# Patient Record
Sex: MALE | Race: WHITE | NOT HISPANIC OR LATINO | Employment: FULL TIME | RURAL
[De-identification: names, ages, dates, MRNs, and addresses within clinical notes are randomized per-mention and may not be internally consistent; named-entity substitution may affect disease eponyms.]

---

## 2021-12-31 ENCOUNTER — HOSPITAL ENCOUNTER (EMERGENCY)
Facility: HOSPITAL | Age: 31
Discharge: HOME | End: 2021-12-31
Payer: COMMERCIAL

## 2021-12-31 VITALS
BODY MASS INDEX: 20.38 KG/M2 | RESPIRATION RATE: 18 BRPM | OXYGEN SATURATION: 98 % | HEIGHT: 63 IN | HEART RATE: 103 BPM | DIASTOLIC BLOOD PRESSURE: 87 MMHG | WEIGHT: 115 LBS | SYSTOLIC BLOOD PRESSURE: 128 MMHG | TEMPERATURE: 102 F

## 2021-12-31 DIAGNOSIS — K08.89 TOOTHACHE: Primary | ICD-10-CM

## 2021-12-31 LAB
FLUAV AG UPPER RESP QL IA.RAPID: NEGATIVE
FLUBV AG UPPER RESP QL IA.RAPID: NEGATIVE
SARS-COV+SARS-COV-2 AG RESP QL IA.RAPID: NEGATIVE

## 2021-12-31 PROCEDURE — 87428 SARSCOV & INF VIR A&B AG IA: CPT | Performed by: NURSE PRACTITIONER

## 2021-12-31 PROCEDURE — 99283 PR EMERGENCY DEPT VISIT,LEVEL III: ICD-10-PCS | Mod: ,,, | Performed by: NURSE PRACTITIONER

## 2021-12-31 PROCEDURE — 99283 EMERGENCY DEPT VISIT LOW MDM: CPT | Mod: ,,, | Performed by: NURSE PRACTITIONER

## 2021-12-31 PROCEDURE — 99283 EMERGENCY DEPT VISIT LOW MDM: CPT

## 2021-12-31 PROCEDURE — 25000003 PHARM REV CODE 250: Performed by: NURSE PRACTITIONER

## 2021-12-31 RX ORDER — ACETAMINOPHEN 500 MG
1000 TABLET ORAL
Status: COMPLETED | OUTPATIENT
Start: 2021-12-31 | End: 2021-12-31

## 2021-12-31 RX ORDER — AMOXICILLIN 500 MG/1
500 CAPSULE ORAL 3 TIMES DAILY
Qty: 30 CAPSULE | Refills: 0 | Status: SHIPPED | OUTPATIENT
Start: 2021-12-31 | End: 2022-01-10

## 2021-12-31 RX ADMIN — ACETAMINOPHEN 1000 MG: 500 TABLET ORAL at 03:12

## 2021-12-31 NOTE — ED PROVIDER NOTES
Encounter Date: 12/31/2021       History     Chief Complaint   Patient presents with    Fever     31 year old male presents to the emergency department to be evaluated for fever and fatigue that began today. Denies any cough, runny nose, body aches, chills, nausea, vomiting, diarrhea, abdominal pain, loss of sense of taste or smell, known sick contacts. His uncle who is with him said he had similar symptoms earlier this week, tested negative for COVID, and his symptoms have resolved.    The history is provided by the patient.   Fever  Primary symptoms of the febrile illness include fever and fatigue. Primary symptoms do not include visual change, headaches, cough, wheezing, shortness of breath, abdominal pain, nausea, vomiting, diarrhea, dysuria, altered mental status, myalgias, arthralgias or rash. The current episode started today.     Review of patient's allergies indicates:   Allergen Reactions    Morphine      History reviewed. No pertinent past medical history.  History reviewed. No pertinent surgical history.  History reviewed. No pertinent family history.  Social History     Tobacco Use    Smoking status: Current Every Day Smoker    Smokeless tobacco: Never Used   Substance Use Topics    Alcohol use: Not Currently    Drug use: Never     Review of Systems   Constitutional: Positive for fatigue and fever.   Respiratory: Negative for cough, shortness of breath and wheezing.    Gastrointestinal: Negative for abdominal pain, diarrhea, nausea and vomiting.   Genitourinary: Negative for dysuria.   Musculoskeletal: Negative for arthralgias and myalgias.   Skin: Negative for rash.   Neurological: Negative for headaches.   All other systems reviewed and are negative.      Physical Exam     Initial Vitals   BP Pulse Resp Temp SpO2   12/31/21 1528 12/31/21 1528 12/31/21 1528 12/31/21 1524 12/31/21 1528   128/87 103 18 (!) 102.3 °F (39.1 °C) 98 %      MAP       --                Physical Exam    Vitals  reviewed.  Constitutional: He appears well-developed and well-nourished.   HENT:   Mouth/Throat: Oropharynx is clear and moist and mucous membranes are normal. No dental abscesses.       Neck: Neck supple.   Cardiovascular: Normal rate and regular rhythm.   Pulmonary/Chest: Breath sounds normal.   Abdominal: Abdomen is soft. Bowel sounds are normal. He exhibits no distension and no mass. There is no abdominal tenderness. There is no rebound and no guarding.   Musculoskeletal:         General: Normal range of motion.      Cervical back: Neck supple.     Neurological: He is alert and oriented to person, place, and time. He has normal strength. GCS score is 15. GCS eye subscore is 4. GCS verbal subscore is 5. GCS motor subscore is 6.   Skin: Skin is warm and dry. Capillary refill takes less than 2 seconds.   Psychiatric: He has a normal mood and affect.         Medical Screening Exam   See Full Note    ED Course   Procedures  Labs Reviewed   SARS-COV2 (COVID) W/ FLU ANTIGEN - Normal    Narrative:     Negative SARS-CoV results should not be used as the sole basis for treatment or patient management decisions; negative results should be considered in the context of a patient's recent exposures, history and the presene of clinical signs and symptoms consistent with COVID-19.  Negative results should be treated as presumptive and confirmed by molecular assay, if necessary for patient management.          Imaging Results    None          Medications   acetaminophen tablet 1,000 mg (1,000 mg Oral Given 12/31/21 1524)                       Clinical Impression:   Final diagnoses:  [K08.89] Toothache (Primary)          ED Disposition Condition    Discharge Stable        ED Prescriptions     Medication Sig Dispense Start Date End Date Auth. Provider    amoxicillin (AMOXIL) 500 MG capsule Take 1 capsule (500 mg total) by mouth 3 (three) times daily. for 10 days 30 capsule 12/31/2021 1/10/2022 NEHEMIAH Nunez         Follow-up Information    None          Elsie Redd, NEHEMIAH  12/31/21 1600       Elsie Redd, NEHEMIAH  12/31/21 1637

## 2021-12-31 NOTE — Clinical Note
"Trav"Davis Santos was seen and treated in our emergency department on 12/31/2021.  He may return to work on 01/03/2022.       If you have any questions or concerns, please don't hesitate to call.      NEHEMIAH Nunez"

## 2021-12-31 NOTE — DISCHARGE INSTRUCTIONS
Follow up with your primary care provider in 2 days. Return to the emergency department for any increase in symptoms or for any other new or worrisome symptoms.

## 2022-03-05 ENCOUNTER — HOSPITAL ENCOUNTER (EMERGENCY)
Facility: HOSPITAL | Age: 32
Discharge: HOME | End: 2022-03-05
Attending: EMERGENCY MEDICINE
Payer: COMMERCIAL

## 2022-03-05 VITALS
DIASTOLIC BLOOD PRESSURE: 82 MMHG | WEIGHT: 109 LBS | HEIGHT: 64 IN | BODY MASS INDEX: 18.61 KG/M2 | OXYGEN SATURATION: 99 % | SYSTOLIC BLOOD PRESSURE: 128 MMHG | RESPIRATION RATE: 16 BRPM | HEART RATE: 94 BPM | TEMPERATURE: 98 F

## 2022-03-05 DIAGNOSIS — A08.4 VIRAL GASTROENTERITIS: Primary | ICD-10-CM

## 2022-03-05 LAB
ALBUMIN SERPL BCP-MCNC: 4 G/DL (ref 3.5–5)
ALBUMIN/GLOB SERPL: 1.4 {RATIO}
ALP SERPL-CCNC: 89 U/L (ref 45–115)
ALT SERPL W P-5'-P-CCNC: 31 U/L (ref 16–61)
ANION GAP SERPL CALCULATED.3IONS-SCNC: 13 MMOL/L (ref 7–16)
AST SERPL W P-5'-P-CCNC: 28 U/L (ref 15–37)
BASOPHILS # BLD AUTO: 0.01 K/UL (ref 0–0.2)
BASOPHILS NFR BLD AUTO: 0.2 % (ref 0–1)
BILIRUB SERPL-MCNC: 0.2 MG/DL (ref 0–1.2)
BUN SERPL-MCNC: 9 MG/DL (ref 7–18)
BUN/CREAT SERPL: 11 (ref 6–20)
CALCIUM SERPL-MCNC: 8.3 MG/DL (ref 8.5–10.1)
CHLORIDE SERPL-SCNC: 101 MMOL/L (ref 98–107)
CO2 SERPL-SCNC: 27 MMOL/L (ref 21–32)
CREAT SERPL-MCNC: 0.82 MG/DL (ref 0.7–1.3)
DIFFERENTIAL METHOD BLD: ABNORMAL
EOSINOPHIL # BLD AUTO: 0.03 K/UL (ref 0–0.5)
EOSINOPHIL NFR BLD AUTO: 0.7 % (ref 1–4)
ERYTHROCYTE [DISTWIDTH] IN BLOOD BY AUTOMATED COUNT: 12.7 % (ref 11.5–14.5)
GLOBULIN SER-MCNC: 2.9 G/DL (ref 2–4)
GLUCOSE SERPL-MCNC: 87 MG/DL (ref 74–106)
HCT VFR BLD AUTO: 42.8 % (ref 40–54)
HGB BLD-MCNC: 14.9 G/DL (ref 13.5–18)
IMM GRANULOCYTES # BLD AUTO: 0.01 K/UL (ref 0–0.04)
IMM GRANULOCYTES NFR BLD: 0.2 % (ref 0–0.4)
LYMPHOCYTES # BLD AUTO: 0.99 K/UL (ref 1–4.8)
LYMPHOCYTES NFR BLD AUTO: 23 % (ref 27–41)
MCH RBC QN AUTO: 32 PG (ref 27–31)
MCHC RBC AUTO-ENTMCNC: 34.8 G/DL (ref 32–36)
MCV RBC AUTO: 92 FL (ref 80–96)
MONOCYTES # BLD AUTO: 0.51 K/UL (ref 0–0.8)
MONOCYTES NFR BLD AUTO: 11.9 % (ref 2–6)
MPC BLD CALC-MCNC: 10.1 FL (ref 9.4–12.4)
NEUTROPHILS # BLD AUTO: 2.75 K/UL (ref 1.8–7.7)
NEUTROPHILS NFR BLD AUTO: 64 % (ref 53–65)
NRBC # BLD AUTO: 0 X10E3/UL
NRBC, AUTO (.00): 0 %
PLATELET # BLD AUTO: 123 K/UL (ref 150–400)
POTASSIUM SERPL-SCNC: 3.8 MMOL/L (ref 3.5–5.1)
PROT SERPL-MCNC: 6.9 G/DL (ref 6.4–8.2)
RBC # BLD AUTO: 4.65 M/UL (ref 4.6–6.2)
SODIUM SERPL-SCNC: 137 MMOL/L (ref 136–145)
WBC # BLD AUTO: 4.3 K/UL (ref 4.5–11)

## 2022-03-05 PROCEDURE — 96374 THER/PROPH/DIAG INJ IV PUSH: CPT

## 2022-03-05 PROCEDURE — 96361 HYDRATE IV INFUSION ADD-ON: CPT

## 2022-03-05 PROCEDURE — 36415 COLL VENOUS BLD VENIPUNCTURE: CPT | Performed by: EMERGENCY MEDICINE

## 2022-03-05 PROCEDURE — 85025 COMPLETE CBC W/AUTO DIFF WBC: CPT | Performed by: EMERGENCY MEDICINE

## 2022-03-05 PROCEDURE — 63600175 PHARM REV CODE 636 W HCPCS: Performed by: EMERGENCY MEDICINE

## 2022-03-05 PROCEDURE — 25000003 PHARM REV CODE 250: Performed by: EMERGENCY MEDICINE

## 2022-03-05 PROCEDURE — 99283 PR EMERGENCY DEPT VISIT,LEVEL III: ICD-10-PCS | Mod: ,,, | Performed by: EMERGENCY MEDICINE

## 2022-03-05 PROCEDURE — 99284 EMERGENCY DEPT VISIT MOD MDM: CPT | Mod: 25

## 2022-03-05 PROCEDURE — 80053 COMPREHEN METABOLIC PANEL: CPT | Performed by: EMERGENCY MEDICINE

## 2022-03-05 PROCEDURE — 99283 EMERGENCY DEPT VISIT LOW MDM: CPT | Mod: ,,, | Performed by: EMERGENCY MEDICINE

## 2022-03-05 RX ORDER — ONDANSETRON 2 MG/ML
4 INJECTION INTRAMUSCULAR; INTRAVENOUS
Status: COMPLETED | OUTPATIENT
Start: 2022-03-05 | End: 2022-03-05

## 2022-03-05 RX ADMIN — SODIUM CHLORIDE 1000 ML: 9 INJECTION, SOLUTION INTRAVENOUS at 10:03

## 2022-03-05 RX ADMIN — ONDANSETRON 4 MG: 2 INJECTION INTRAMUSCULAR; INTRAVENOUS at 10:03

## 2022-03-05 NOTE — DISCHARGE INSTRUCTIONS
TAKE NAUSEA MEDICATION AS DIRECTED.  DRINK PLENTY OF FLUIDS.  RETURN TO THE EMERGENCY DEPARTMENT OR FOLLOW UP WITH YOUR PRIMARY CARE PROVIDER IF SYMPTOMS PERSIST OR WORSEN OR OTHERWISE AS NEEDED.

## 2022-03-05 NOTE — ED PROVIDER NOTES
Encounter Date: 3/5/2022       History     Chief Complaint   Patient presents with    Abdominal Pain    Diarrhea     30 y/o male with nausea, vomiting, and diarrhea that started 4 days ago.  He has been having abdominal cramping.  Symptoms are moderate.  Patient notes no remitting or exacerbating factors.          Review of patient's allergies indicates:   Allergen Reactions    Morphine      Past Medical History:   Diagnosis Date    SVT (supraventricular tachycardia)      History reviewed. No pertinent surgical history.  No family history on file.  Social History     Tobacco Use    Smoking status: Current Every Day Smoker     Types: Cigarettes    Smokeless tobacco: Never Used   Substance Use Topics    Alcohol use: Not Currently    Drug use: Never     Review of Systems   All other systems reviewed and are negative.      Physical Exam     Initial Vitals [03/05/22 0958]   BP Pulse Resp Temp SpO2   128/82 94 16 98.1 °F (36.7 °C) 99 %      MAP       --         Physical Exam    Nursing note and vitals reviewed.  Constitutional: He appears well-developed and well-nourished.   HENT:   Head: Normocephalic and atraumatic.   Nose: Nose normal.   Mouth/Throat: Oropharynx is clear and moist.   Eyes: Conjunctivae and EOM are normal. Pupils are equal, round, and reactive to light.   Neck: Neck supple.   Normal range of motion.  Cardiovascular: Normal rate, regular rhythm, normal heart sounds and intact distal pulses.   Pulmonary/Chest: Breath sounds normal.   Abdominal: Abdomen is soft. Bowel sounds are normal.   Musculoskeletal:         General: Normal range of motion.      Cervical back: Normal range of motion and neck supple.     Neurological: He is alert and oriented to person, place, and time. He has normal strength. GCS score is 15. GCS eye subscore is 4. GCS verbal subscore is 5. GCS motor subscore is 6.   Skin: Skin is warm and dry. Capillary refill takes less than 2 seconds.   Psychiatric: He has a normal mood and  affect. Thought content normal.         Medical Screening Exam   See Full Note    ED Course   Procedures  Labs Reviewed   COMPREHENSIVE METABOLIC PANEL - Abnormal; Notable for the following components:       Result Value    Calcium 8.3 (*)     All other components within normal limits   CBC WITH DIFFERENTIAL - Abnormal; Notable for the following components:    WBC 4.30 (*)     MCH 32.0 (*)     Platelet Count 123 (*)     Lymphocytes % 23.0 (*)     Monocytes % 11.9 (*)     Eosinophils % 0.7 (*)     Lymphocytes, Absolute 0.99 (*)     All other components within normal limits   CBC W/ AUTO DIFFERENTIAL    Narrative:     The following orders were created for panel order CBC auto differential.  Procedure                               Abnormality         Status                     ---------                               -----------         ------                     CBC with Differential[[id number]]        Abnormal            Final result                 Please view results for these tests on the individual orders.          Imaging Results    None          Medications   sodium chloride 0.9% bolus 1,000 mL (1,000 mLs Intravenous New Bag 3/5/22 1042)   ondansetron injection 4 mg (4 mg Intravenous Given 3/5/22 1043)                       Clinical Impression:   Final diagnoses:  [A08.4] Viral gastroenteritis (Primary)          ED Disposition Condition    Discharge Stable        ED Prescriptions     None        Follow-up Information     Follow up With Specialties Details Why Contact Info    [facility] - Emergency Department Emergency Medicine  As needed [address] [place] [id number] [phone]           German Regalado MD  03/05/22 1570

## 2022-05-31 ENCOUNTER — HOSPITAL ENCOUNTER (EMERGENCY)
Facility: HOSPITAL | Age: 32
Discharge: HOME | End: 2022-05-31
Attending: FAMILY MEDICINE
Payer: COMMERCIAL

## 2022-05-31 VITALS
HEIGHT: 64 IN | OXYGEN SATURATION: 98 % | SYSTOLIC BLOOD PRESSURE: 103 MMHG | RESPIRATION RATE: 22 BRPM | DIASTOLIC BLOOD PRESSURE: 69 MMHG | HEART RATE: 82 BPM | BODY MASS INDEX: 17.93 KG/M2 | TEMPERATURE: 99 F | WEIGHT: 105 LBS

## 2022-05-31 DIAGNOSIS — R07.9 CHEST PAIN: ICD-10-CM

## 2022-05-31 DIAGNOSIS — U07.1 COVID: ICD-10-CM

## 2022-05-31 DIAGNOSIS — J06.9 UPPER RESPIRATORY TRACT INFECTION, UNSPECIFIED TYPE: Primary | ICD-10-CM

## 2022-05-31 LAB
ALBUMIN SERPL BCP-MCNC: 4 G/DL (ref 3.5–5)
ALBUMIN/GLOB SERPL: 1.3 {RATIO}
ALP SERPL-CCNC: 90 U/L (ref 45–115)
ALT SERPL W P-5'-P-CCNC: 28 U/L (ref 16–61)
ANION GAP SERPL CALCULATED.3IONS-SCNC: 13 MMOL/L (ref 7–16)
AST SERPL W P-5'-P-CCNC: 43 U/L (ref 15–37)
BASOPHILS # BLD AUTO: 0.01 K/UL (ref 0–0.2)
BASOPHILS NFR BLD AUTO: 0.2 % (ref 0–1)
BILIRUB SERPL-MCNC: 0.2 MG/DL (ref 0–1.2)
BUN SERPL-MCNC: 12 MG/DL (ref 7–18)
BUN/CREAT SERPL: 13 (ref 6–20)
CALCIUM SERPL-MCNC: 8.8 MG/DL (ref 8.5–10.1)
CHLORIDE SERPL-SCNC: 100 MMOL/L (ref 98–107)
CO2 SERPL-SCNC: 26 MMOL/L (ref 21–32)
CREAT SERPL-MCNC: 0.96 MG/DL (ref 0.7–1.3)
DIFFERENTIAL METHOD BLD: ABNORMAL
EOSINOPHIL # BLD AUTO: 0.03 K/UL (ref 0–0.5)
EOSINOPHIL NFR BLD AUTO: 0.6 % (ref 1–4)
ERYTHROCYTE [DISTWIDTH] IN BLOOD BY AUTOMATED COUNT: 12.7 % (ref 11.5–14.5)
FLUAV AG UPPER RESP QL IA.RAPID: NEGATIVE
FLUBV AG UPPER RESP QL IA.RAPID: NEGATIVE
GLOBULIN SER-MCNC: 3.1 G/DL (ref 2–4)
GLUCOSE SERPL-MCNC: 88 MG/DL (ref 74–106)
HCT VFR BLD AUTO: 43.1 % (ref 40–54)
HGB BLD-MCNC: 15 G/DL (ref 13.5–18)
IMM GRANULOCYTES # BLD AUTO: 0.01 K/UL (ref 0–0.04)
IMM GRANULOCYTES NFR BLD: 0.2 % (ref 0–0.4)
LYMPHOCYTES # BLD AUTO: 1.75 K/UL (ref 1–4.8)
LYMPHOCYTES NFR BLD AUTO: 32.6 % (ref 27–41)
MCH RBC QN AUTO: 32.2 PG (ref 27–31)
MCHC RBC AUTO-ENTMCNC: 34.8 G/DL (ref 32–36)
MCV RBC AUTO: 92.5 FL (ref 80–96)
MONOCYTES # BLD AUTO: 0.54 K/UL (ref 0–0.8)
MONOCYTES NFR BLD AUTO: 10.1 % (ref 2–6)
MPC BLD CALC-MCNC: 10.6 FL (ref 9.4–12.4)
NEUTROPHILS # BLD AUTO: 3.03 K/UL (ref 1.8–7.7)
NEUTROPHILS NFR BLD AUTO: 56.3 % (ref 53–65)
NRBC # BLD AUTO: 0 X10E3/UL
NRBC, AUTO (.00): 0 %
NT-PROBNP SERPL-MCNC: 14 PG/ML (ref 1–125)
PLATELET # BLD AUTO: 108 K/UL (ref 150–400)
POTASSIUM SERPL-SCNC: 4.2 MMOL/L (ref 3.5–5.1)
PROT SERPL-MCNC: 7.1 G/DL (ref 6.4–8.2)
RBC # BLD AUTO: 4.66 M/UL (ref 4.6–6.2)
SARS-COV+SARS-COV-2 AG RESP QL IA.RAPID: POSITIVE
SODIUM SERPL-SCNC: 135 MMOL/L (ref 136–145)
TROPONIN I SERPL HS-MCNC: 7.3 PG/ML
WBC # BLD AUTO: 5.37 K/UL (ref 4.5–11)

## 2022-05-31 PROCEDURE — 84484 ASSAY OF TROPONIN QUANT: CPT | Performed by: FAMILY MEDICINE

## 2022-05-31 PROCEDURE — 63600175 PHARM REV CODE 636 W HCPCS: Performed by: FAMILY MEDICINE

## 2022-05-31 PROCEDURE — 96375 TX/PRO/DX INJ NEW DRUG ADDON: CPT

## 2022-05-31 PROCEDURE — 83880 ASSAY OF NATRIURETIC PEPTIDE: CPT | Performed by: FAMILY MEDICINE

## 2022-05-31 PROCEDURE — 93010 ELECTROCARDIOGRAM REPORT: CPT | Mod: ,,, | Performed by: INTERNAL MEDICINE

## 2022-05-31 PROCEDURE — 99284 PR EMERGENCY DEPT VISIT,LEVEL IV: ICD-10-PCS | Mod: ,,, | Performed by: FAMILY MEDICINE

## 2022-05-31 PROCEDURE — 85025 COMPLETE CBC W/AUTO DIFF WBC: CPT | Performed by: FAMILY MEDICINE

## 2022-05-31 PROCEDURE — 96374 THER/PROPH/DIAG INJ IV PUSH: CPT

## 2022-05-31 PROCEDURE — 99285 EMERGENCY DEPT VISIT HI MDM: CPT | Mod: 25

## 2022-05-31 PROCEDURE — 93005 ELECTROCARDIOGRAM TRACING: CPT

## 2022-05-31 PROCEDURE — 80053 COMPREHEN METABOLIC PANEL: CPT | Performed by: FAMILY MEDICINE

## 2022-05-31 PROCEDURE — 36415 COLL VENOUS BLD VENIPUNCTURE: CPT | Performed by: FAMILY MEDICINE

## 2022-05-31 PROCEDURE — 87428 SARSCOV & INF VIR A&B AG IA: CPT | Performed by: FAMILY MEDICINE

## 2022-05-31 PROCEDURE — 99284 EMERGENCY DEPT VISIT MOD MDM: CPT | Mod: ,,, | Performed by: FAMILY MEDICINE

## 2022-05-31 PROCEDURE — 93010 EKG 12-LEAD: ICD-10-PCS | Mod: ,,, | Performed by: INTERNAL MEDICINE

## 2022-05-31 RX ORDER — KETOROLAC TROMETHAMINE 30 MG/ML
30 INJECTION, SOLUTION INTRAMUSCULAR; INTRAVENOUS
Status: COMPLETED | OUTPATIENT
Start: 2022-05-31 | End: 2022-05-31

## 2022-05-31 RX ORDER — DEXAMETHASONE SODIUM PHOSPHATE 4 MG/ML
4 INJECTION, SOLUTION INTRA-ARTICULAR; INTRALESIONAL; INTRAMUSCULAR; INTRAVENOUS; SOFT TISSUE
Status: COMPLETED | OUTPATIENT
Start: 2022-05-31 | End: 2022-05-31

## 2022-05-31 RX ADMIN — KETOROLAC TROMETHAMINE 30 MG: 30 INJECTION, SOLUTION INTRAMUSCULAR; INTRAVENOUS at 01:05

## 2022-05-31 RX ADMIN — DEXAMETHASONE SODIUM PHOSPHATE 4 MG: 4 INJECTION, SOLUTION INTRAMUSCULAR; INTRAVENOUS at 01:05

## 2022-05-31 NOTE — Clinical Note
"Trav"Davis Santos was seen and treated in our emergency department on 5/31/2022.  He may return to work on 06/06/2022.       If you have any questions or concerns, please don't hesitate to call.      Colette Crawford MD"

## 2022-05-31 NOTE — ED PROVIDER NOTES
Encounter Date: 5/31/2022       History     Chief Complaint   Patient presents with    Chest Pain    Dizziness     Patient is a 32-year-old  male, presents emergency department with ear pain, chest pain come a and jaw pain.  Patient states 3 days ago he had a subjective fever, ear pain, and a sore throat.  He feels like it was due to sinuses and reports that it has mostly subsided.  He is concerned he has an ear infection.  This morning at work he picked up something heavy and started experiencing chest pain.  He has got a history of SVT, but no other cardiac history.  He denies any palpitations.  Pain is worse when he moves.        Review of patient's allergies indicates:   Allergen Reactions    Morphine      Past Medical History:   Diagnosis Date    SVT (supraventricular tachycardia)      History reviewed. No pertinent surgical history.  History reviewed. No pertinent family history.  Social History     Tobacco Use    Smoking status: Current Every Day Smoker     Types: Cigarettes    Smokeless tobacco: Never Used   Substance Use Topics    Alcohol use: Not Currently    Drug use: Never     Review of Systems   Constitutional: Positive for fever.   HENT: Positive for ear pain and sore throat.    Cardiovascular: Positive for chest pain.   All other systems reviewed and are negative.      Physical Exam     Initial Vitals [05/31/22 1223]   BP Pulse Resp Temp SpO2   105/78 (!) 114 16 98.7 °F (37.1 °C) 98 %      MAP       --         Physical Exam    Constitutional: He appears well-developed and well-nourished.   HENT:   Head: Normocephalic.   Right Ear: External ear normal.   Left Ear: External ear normal.   TMs normal bilaterally, tonsils are not swollen, erythematous oropharynx with drainage posteriorly.   Eyes: Pupils are equal, round, and reactive to light.   Cardiovascular: Normal rate.   Pulmonary/Chest: Breath sounds normal. No respiratory distress. He has no wheezes.   Abdominal: Abdomen is soft.  Bowel sounds are normal. He exhibits no distension.     Neurological: He is alert and oriented to person, place, and time.   Psychiatric: He has a normal mood and affect.         Medical Screening Exam   See Full Note    ED Course   Procedures  Labs Reviewed   COMPREHENSIVE METABOLIC PANEL - Abnormal; Notable for the following components:       Result Value    Sodium 135 (*)     AST 43 (*)     All other components within normal limits   CBC WITH DIFFERENTIAL - Abnormal; Notable for the following components:    MCH 32.2 (*)     Platelet Count 108 (*)     Monocytes % 10.1 (*)     Eosinophils % 0.6 (*)     All other components within normal limits   SARS-COV2 (COVID) W/ FLU ANTIGEN - Abnormal; Notable for the following components:    COVID-19 Ag Positive (*)     All other components within normal limits    Narrative:     Positive SARS-CoV antigen results indicate the presence of viral antigens; correlation with patient history and presence of clinical signs & symptoms consistent with COVID-19 are necessary to determine infection status.   TROPONIN I - Normal   NT-PRO NATRIURETIC PEPTIDE - Normal   CBC W/ AUTO DIFFERENTIAL    Narrative:     The following orders were created for panel order CBC auto differential.  Procedure                               Abnormality         Status                     ---------                               -----------         ------                     CBC with Differential[[id number]]        Abnormal            Final result                 Please view results for these tests on the individual orders.   EXTRA TUBES    Narrative:     The following orders were created for panel order EXTRA TUBES.  Procedure                               Abnormality         Status                     ---------                               -----------         ------                     Light Blue Top Hold[[id number]]                              In process                   Please view results for these tests  on the individual orders.   LIGHT BLUE TOP HOLD        ECG Results          EKG 12-lead (Final result)  Result time 05/31/22 22:06:06    Final result by Interface, Lab In [facility] (05/31/22 22:06:06)                 Narrative:    Test Reason : R07.9,    Vent. Rate : 097 BPM     Atrial Rate : 000 BPM     P-R Int : 108 ms          QRS Dur : 080 ms      QT Int : 316 ms       P-R-T Axes : 077 095 055 degrees     QTc Int : 381 ms    Sinus rhythm  Short MN interval  Rightward axis  Borderline ECG    Confirmed by Ezekiel VELAZQUEZ, Candie BONDS ([id number]) on 5/31/2022 10:06:02 PM    Referred By: [email or web address]   SELF           Confirmed By:Candie Falcon MD                            Imaging Results          X-Ray Chest AP Portable (Final result)  Result time 05/31/22 12:58:33    Final result by Jagdish Virgen MD (05/31/22 12:58:33)                 Impression:      No acute cardiopulmonary process      Electronically signed by: Jagdish Virgen  Date:    05/31/2022  Time:    12:58             Narrative:    EXAMINATION:  XR CHEST AP PORTABLE    CLINICAL HISTORY:  Chest Pain;.    COMPARISON:  None    TECHNIQUE:  AP portable erect chest    FINDINGS:  The cardiac silhouette is not enlarged.  There is no mediastinal mass.  There is no pulmonary vascular engorgement.    Lungs and pleural spaces are generally clear.    Osseous structures are unremarkable                                 Medications   dexamethasone injection 4 mg (4 mg Intravenous Given 5/31/22 1359)   ketorolac injection 30 mg (30 mg Intravenous Given 5/31/22 1359)                       Clinical Impression:   Final diagnoses:  [R07.9] Chest pain  [J06.9] Upper respiratory tract infection, unspecified type (Primary)  [U07.1] COVID          ED Disposition Condition    Discharge Stable        ED Prescriptions     None        Follow-up Information    None          Colette Crawford MD  06/02/22 2700

## 2022-05-31 NOTE — DISCHARGE INSTRUCTIONS
You can take ibuprofen and Tylenol over-the-counter to help with her chest pain.  You can also take over-the-counter medications to help with your sinus symptoms.  Follow up with a primary care provider or immediate care in 1 week if your symptoms are not improved.  Return to emergency department for new or worsening symptoms.      If You Test Positive for COVID-19 (Isolate)  Everyone, regardless of vaccination status.    Stay home for 5 days.  If you have no symptoms or your symptoms are resolving after 5 days, you can leave your house.  Continue to wear a mask around others for 5 additional days.  If you have a fever, continue to stay home until your fever resolves.    If You Were Exposed to Someone with COVID-19 (Quarantine)  If you:    Have been boosted OR Completed the primary series of Pfizer or Moderna vaccine within the last 6 months OR Completed the primary series of J&J vaccine within the last 2 months    Wear a mask around others for 10 days.  Test on day 5, if possible.  If you develop symptoms get a test and stay home.    If you Completed the primary series of Pfizer or Moderna vaccine over 6 months ago and are not boosted OR Completed the primary series of J&J over 2 months ago and are not boosted OR are unvaccinated    Stay home for 5 days. After that continue to wear a mask around others for 5 additional days.  If you cant quarantine you must wear a mask for 10 days.  Test on day 5 if possible.  If you develop symptoms get a test and stay home

## 2022-05-31 NOTE — Clinical Note
"Trav"Davis Santos was seen and treated in our emergency department on 5/31/2022.  He may return to work on 06/01/2022.       If you have any questions or concerns, please don't hesitate to call.      Colette Crawford MD"

## 2022-06-02 ENCOUNTER — TELEPHONE (OUTPATIENT)
Dept: EMERGENCY MEDICINE | Facility: HOSPITAL | Age: 32
End: 2022-06-02
Payer: COMMERCIAL

## 2022-08-31 ENCOUNTER — OFFICE VISIT (OUTPATIENT)
Dept: FAMILY MEDICINE | Facility: CLINIC | Age: 32
End: 2022-08-31
Payer: COMMERCIAL

## 2022-08-31 VITALS
HEIGHT: 64 IN | HEART RATE: 92 BPM | TEMPERATURE: 98 F | SYSTOLIC BLOOD PRESSURE: 108 MMHG | WEIGHT: 112 LBS | OXYGEN SATURATION: 99 % | DIASTOLIC BLOOD PRESSURE: 64 MMHG | BODY MASS INDEX: 19.12 KG/M2

## 2022-08-31 DIAGNOSIS — R05.9 COUGH: ICD-10-CM

## 2022-08-31 DIAGNOSIS — U09.9 POST-COVID SYNDROME: Primary | ICD-10-CM

## 2022-08-31 DIAGNOSIS — J06.9 UPPER RESPIRATORY TRACT INFECTION, UNSPECIFIED TYPE: ICD-10-CM

## 2022-08-31 LAB
CTP QC/QA: YES
SARS-COV-2 AG RESP QL IA.RAPID: NEGATIVE

## 2022-08-31 PROCEDURE — 87426 SARSCOV CORONAVIRUS AG IA: CPT | Mod: QW,,, | Performed by: NURSE PRACTITIONER

## 2022-08-31 PROCEDURE — 87426 SARS CORONAVIRUS 2 ANTIGEN POCT: ICD-10-PCS | Mod: QW,,, | Performed by: NURSE PRACTITIONER

## 2022-08-31 PROCEDURE — 99203 PR OFFICE/OUTPT VISIT, NEW, LEVL III, 30-44 MIN: ICD-10-PCS | Mod: ,,, | Performed by: NURSE PRACTITIONER

## 2022-08-31 PROCEDURE — 99203 OFFICE O/P NEW LOW 30 MIN: CPT | Mod: ,,, | Performed by: NURSE PRACTITIONER

## 2022-08-31 RX ORDER — AZITHROMYCIN 250 MG/1
TABLET, FILM COATED ORAL
Qty: 6 TABLET | Refills: 0 | Status: SHIPPED | OUTPATIENT
Start: 2022-08-31 | End: 2022-09-05

## 2022-08-31 RX ORDER — METHYLPREDNISOLONE 4 MG/1
TABLET ORAL
Qty: 21 EACH | Refills: 0 | Status: SHIPPED | OUTPATIENT
Start: 2022-08-31 | End: 2022-09-21

## 2022-08-31 NOTE — PROGRESS NOTES
NEHEMIAH Casey   [facility]  [address] [address] [address]  [phone]      PATIENT NAME: Trav Santos  : 1990  DATE: 22  MRN: [id number]      Billing Provider: NEHEMIAH Casey  Level of Service:   Patient PCP Information       Provider PCP Type    Primary Doctor No General            Reason for Visit / Chief Complaint: COVID-19 Concerns (Had covid 2 weeks ago, having trouble with fatigue and memory fog )       Update PCP  Update Chief Complaint         History of Present Illness / Problem Focused Workflow       32 year old male presents with cough, congestion, fatigue, memory foggy at times  Reports it started after having covid about 3 mo  Complaints of having shortness of breath at times  He is a current every day smoker    Hx of SVT    Review of Systems     Review of Systems   Constitutional:  Positive for fatigue. Negative for fever.   HENT:  Positive for congestion. Negative for ear pain and sore throat.    Eyes:  Negative for discharge.   Respiratory:  Positive for cough and shortness of breath (occasional).    Cardiovascular:  Negative for chest pain.   Gastrointestinal:  Positive for diarrhea. Negative for abdominal pain and nausea.   Endocrine: Negative for cold intolerance and heat intolerance.   Musculoskeletal:  Negative for gait problem.   Skin:  Negative for rash.   Neurological:  Positive for headaches. Negative for dizziness and weakness.   Psychiatric/Behavioral:  Positive for agitation. Negative for dysphoric mood.      Medical / Social / Family History     Past Medical History:   Diagnosis Date    SVT (supraventricular tachycardia)        History reviewed. No pertinent surgical history.    Social History    reports that he has been smoking cigarettes. He has never used smokeless tobacco. He reports that he does not currently use alcohol. He reports that he does not use drugs.    Family History  Mr.'s family  history is not on file.    Medications and Allergies     Medications  No outpatient medications have been marked as taking for the 8/31/22 encounter (Office Visit) with NEHEMIAH Casey.       Allergies  Review of patient's allergies indicates:   Allergen Reactions    Morphine        Physical Examination     Vitals:    08/31/22 1532   BP: 108/64   Pulse: 92   Temp: 98.1 °F (36.7 °C)     Physical Exam  Constitutional:       General: He is not in acute distress.  HENT:      Head: Normocephalic.      Right Ear: Tympanic membrane normal.      Left Ear: Tympanic membrane normal.      Nose: Congestion present.      Mouth/Throat:      Mouth: Mucous membranes are moist.      Pharynx: No posterior oropharyngeal erythema.   Eyes:      Extraocular Movements: Extraocular movements intact.   Cardiovascular:      Rate and Rhythm: Normal rate.   Pulmonary:      Effort: Pulmonary effort is normal. No respiratory distress.      Breath sounds: No wheezing.   Abdominal:      General: Bowel sounds are normal.      Palpations: Abdomen is soft.   Musculoskeletal:         General: Normal range of motion.      Cervical back: Neck supple.   Skin:     General: Skin is warm.   Neurological:      Mental Status: He is alert and oriented to person, place, and time.   Psychiatric:         Behavior: Behavior normal.         Imaging / Labs     Office Visit on 08/31/2022   Component Date Value Ref Range Status    SARS Coronavirus 2 Antigen 08/31/2022 Negative  Negative Final     Acceptable 08/31/2022 Yes   Final     X-Ray Chest AP Portable  Narrative: EXAMINATION:  XR CHEST AP PORTABLE    CLINICAL HISTORY:  Chest Pain;.    COMPARISON:  None    TECHNIQUE:  AP portable erect chest    FINDINGS:  The cardiac silhouette is not enlarged.  There is no mediastinal mass.  There is no pulmonary vascular engorgement.    Lungs and pleural spaces are generally clear.    Osseous structures are unremarkable  Impression: No acute cardiopulmonary  process    Electronically signed by: Jagidsh Virgen  Date:    05/31/2022  Time:    12:58    Assessment and Plan (including Health Maintenance)      Problem List  Smart Sets  Document Outside HM   :    Health Maintenance Due   Topic Date Due    Hepatitis C Screening  Never done    Lipid Panel  Never done    COVID-19 Vaccine (1) Never done    Pneumococcal Vaccines (Age 0-64) (1 - PCV) Never done    HIV Screening  Never done    TETANUS VACCINE  Never done       Problem List Items Addressed This Visit          ID    Post-COVID syndrome - Primary     Other Visit Diagnoses       Cough        Relevant Medications    methylPREDNISolone (MEDROL DOSEPACK) 4 mg tablet    Other Relevant Orders    SARS Coronavirus 2 Antigen, POCT (Completed)    Upper respiratory tract infection, unspecified type        Relevant Medications    azithromycin (Z-KAMARI) 250 MG tablet    methylPREDNISolone (MEDROL DOSEPACK) 4 mg tablet          Covid is negative today  Treat for URI  Rest. Increase fluids as tolerated  Discussed smoking cessation   Follow up if symptoms fail to improve    Health Maintenance Topics with due status: Not Due       Topic Last Completion Date    Influenza Vaccine Not Due             Signature:  NEHEMIAH Casey  [facility] [address] [place] [address]  [phone]    Date of encounter: 8/31/22

## 2024-02-21 ENCOUNTER — OFFICE VISIT (OUTPATIENT)
Dept: FAMILY MEDICINE | Facility: CLINIC | Age: 34
End: 2024-02-21

## 2024-02-21 VITALS
DIASTOLIC BLOOD PRESSURE: 86 MMHG | HEART RATE: 106 BPM | RESPIRATION RATE: 18 BRPM | HEIGHT: 64 IN | TEMPERATURE: 98 F | SYSTOLIC BLOOD PRESSURE: 133 MMHG | WEIGHT: 115.81 LBS | BODY MASS INDEX: 19.77 KG/M2 | OXYGEN SATURATION: 99 %

## 2024-02-21 DIAGNOSIS — D69.6 THROMBOCYTOPENIA: ICD-10-CM

## 2024-02-21 DIAGNOSIS — F41.9 ANXIETY AND DEPRESSION: Primary | ICD-10-CM

## 2024-02-21 DIAGNOSIS — Z13.220 SCREENING FOR LIPID DISORDERS: ICD-10-CM

## 2024-02-21 DIAGNOSIS — R53.83 FATIGUE, UNSPECIFIED TYPE: ICD-10-CM

## 2024-02-21 DIAGNOSIS — Z11.4 ENCOUNTER FOR SCREENING FOR HIV: ICD-10-CM

## 2024-02-21 DIAGNOSIS — Z11.59 NEED FOR HEPATITIS C SCREENING TEST: ICD-10-CM

## 2024-02-21 DIAGNOSIS — F32.A ANXIETY AND DEPRESSION: Primary | ICD-10-CM

## 2024-02-21 PROCEDURE — 99214 OFFICE O/P EST MOD 30 MIN: CPT | Mod: ,,, | Performed by: STUDENT IN AN ORGANIZED HEALTH CARE EDUCATION/TRAINING PROGRAM

## 2024-02-21 RX ORDER — ESCITALOPRAM OXALATE 10 MG/1
10 TABLET ORAL DAILY
Qty: 30 TABLET | Refills: 0 | Status: SHIPPED | OUTPATIENT
Start: 2024-02-21 | End: 2024-03-20 | Stop reason: SDUPTHER

## 2024-02-21 NOTE — PROGRESS NOTES
Health Maintenance Due   Topic Date Due    Hepatitis C Screening  Never done    Lipid Panel  Never done    COVID-19 Vaccine (1) Never done    Pneumococcal Vaccines (Age 0-64) (1 of 2 - PCV) Never done    HIV Screening  Never done    TETANUS VACCINE  Never done    Influenza Vaccine (1) Never done     Care gaps discussed with patient. Patient declined vaccines at this time

## 2024-02-21 NOTE — PROGRESS NOTES
Progress Note     ANDREI HOLLOWAY MD   [address]  [place] [address]     PATIENT NAME: Trav Santos  : 1990  DATE: 24  MRN: [id number]      Billing Provider: ANDREI HOLLOWAY MD  Level of Service:   Patient PCP Information       Provider PCP Type    ANDREI HOLLOWAY MD General                Fatigue (Patient reports fatigue since he covid last year. ), Dizziness (Patient reports he becomes dizzy after working hard. This also started last year after he had covid./Patient reports brain fog as well since he had covid last year. ), and Depression (Patient reports feeling depressed and anxious and would like to discuss options on medications. )      SUBJECTIVE:     Trav Santos is a 33 y.o.male who presents to clinic for Fatigue (Patient reports fatigue since he covid last year. ), Dizziness (Patient reports he becomes dizzy after working hard. This also started last year after he had covid./Patient reports brain fog as well since he had covid last year. ), and Depression (Patient reports feeling depressed and anxious and would like to discuss options on medications. )    Patient presents to clinic today with fatigue and intermittent imbalance.  Patient notes onset following a COVID infection over 1 year ago.  Patient notes that he has pretty chronic fatigue and brain fog with decreased concentration.  Patient states he has dizziness but describes it more as imbalance.  He states that it lasts for couple of days approximately 1 time per month.  Patient states that during these episodes he will have some dry/itchy ears at the same time.  Patient denies any associated ear pain, hearing loss, or tinnitus.  Patient is not currently having any symptoms of imbalance.    Patient also has a longstanding history of anxiety for which he was previously treated with Zoloft and Risperdal.  Patient states he was on Zoloft for approximately 1 year and then discontinued it.  Patient  states that he has been anxious almost his entire life.  Patient gets social anxiety and has a urge to had in the break room at work to avoid talking to anyone.  He has difficulty maintaining eye contact secondary to his anxiety.  It was affecting his quality of life.  Patient's mood has also been depressed.  He has some anhedonia.  He was decreased concentration and associated fatigue.  Patient sleeps a proximally from 11:00 p.m. to 5:00 a.m..  He feels rested when he wakes up and has no problems falling or staying asleep.  Patient notes his fatigue worsens throughout the day.  He has worsening symptoms after lunch.  Patient denies any current SI.  Patient has had passive SI in the past.    Has history of thrombocytopenia. No known history of liver disease or alcohol use.     Smokes 1 ppd. Has been smoking since age 17. Tried to quit using vapes.  Not interested in cessation at this time.    All other pertinent review of systems negative. Please see HPI for details.     Past Medical History:  has a past medical history of SVT (supraventricular tachycardia).   Past Surgical History:  has no past surgical history on file.  Family History: family history includes Bladder Cancer in his mother; Cervical cancer in his mother; Heart attack in his father; Ovarian cancer in his mother.  Social History:  reports that he has been smoking cigarettes. He started smoking about 17 years ago. He has a 17.1 pack-year smoking history. He has been exposed to tobacco smoke. He has never used smokeless tobacco. He reports that he does not currently use alcohol. He reports that he does not use drugs.  Allergies:   Review of patient's allergies indicates:   Allergen Reactions    Morphine          Current Outpatient Medications:     EScitalopram oxalate (LEXAPRO) 10 MG tablet, Take 1 tablet (10 mg total) by mouth once daily., Disp: 30 tablet, Rfl: 0   OBJECTIVE:     Vital Signs   /86 (BP Location: Right arm, Patient Position:  "Sitting, BP Method: Medium (Automatic))   Pulse 106   Temp 98.1 °F (36.7 °C) (Oral)   Resp 18   Ht 5' 4" (1.626 m)   Wt 52.5 kg (115 lb 12.8 oz)   SpO2 99%   BMI 19.88 kg/m²     Physical Exam  Constitutional:       General: He is not in acute distress.     Appearance: Normal appearance. He is not ill-appearing.   HENT:      Head: Normocephalic and atraumatic.      Right Ear: Tympanic membrane normal.      Left Ear: Tympanic membrane normal.      Nose: No congestion or rhinorrhea.      Mouth/Throat:      Pharynx: No oropharyngeal exudate or posterior oropharyngeal erythema.   Eyes:      Extraocular Movements: Extraocular movements intact.      Pupils: Pupils are equal, round, and reactive to light.   Cardiovascular:      Rate and Rhythm: Normal rate and regular rhythm.      Heart sounds: No murmur heard.     No friction rub. No gallop.   Pulmonary:      Effort: Pulmonary effort is normal. No respiratory distress.      Breath sounds: Normal breath sounds. No wheezing, rhonchi or rales.   Abdominal:      Palpations: Abdomen is soft.      Tenderness: There is no abdominal tenderness. There is no guarding or rebound.   Musculoskeletal:         General: Normal range of motion.      Right lower leg: No edema.      Left lower leg: No edema.   Skin:     General: Skin is warm and dry.      Capillary Refill: Capillary refill takes less than 2 seconds.   Neurological:      General: No focal deficit present.      Mental Status: He is alert.   Psychiatric:         Mood and Affect: Mood normal.         Behavior: Behavior normal.         ASSESSMENT/PLAN:     1. Anxiety and depression  -     EScitalopram oxalate (LEXAPRO) 10 MG tablet; Take 1 tablet (10 mg total) by mouth once daily.  Dispense: 30 tablet; Refill: 0  Patient with both anxiety and depression.  Patient with longstanding history of anxiety.  Patient without current SI.  We will start low-dose Lexapro.  Patient take half a tablet daily for one-week and should then " increase to whole tablet per day.  Patient counseled on potential risks and side effects of medication including black box warning.  Patient verbalized understanding.  Patient to follow up in 1 month for re-evaluation to ensure symptoms are improving and he was tolerating the medication without difficulty.  Patient to call immediately with any questions or concerns.    2. Fatigue, unspecified type  -     CBC Auto Differential; Future; Expected date: 02/21/2024  -     Comprehensive Metabolic Panel; Future; Expected date: 02/21/2024  -     TSH; Future; Expected date: 02/21/2024  -     Testosterone; Future; Expected date: 02/21/2024  -     Vitamin B12; Future; Expected date: 02/21/2024  Patient with significant fatigue since being diagnosed with COVID-19.  Patient has had chronic fatigue for the past year.  It was possible it is depression/anxiety is contributing.  However, given chronicity we will perform additional evaluation.    3. Thrombocytopenia  Patient with a history of thrombocytopenia.  We will obtain repeat CBC to ensure resolution.  Patient without any known history of liver disease or alcoholism.    4. Screening for lipid disorders  -     Lipid Panel; Future; Expected date: 02/21/2024  Patient due for lipid panel.  We will obtain screening lipid.    5. Encounter for screening for HIV  -     HIV 1/2 Ag/Ab (4th Gen); Future; Expected date: 02/21/2024    6. Need for hepatitis C screening test  -     Hepatitis C Antibody; Future; Expected date: 02/21/2024  Patient amenable to both HIV and hepatitis-C screening.  We will obtain.      Follow up in about 4 weeks (around 3/20/2024) for Recheck Anxiety/Depression.      ANDREI HOLLOWAY MD  02/21/2024    Due to voice recognition software, sound alike and misspelled words may be contained in the documentation.

## 2024-03-20 ENCOUNTER — OFFICE VISIT (OUTPATIENT)
Dept: FAMILY MEDICINE | Facility: CLINIC | Age: 34
End: 2024-03-20

## 2024-03-20 VITALS
SYSTOLIC BLOOD PRESSURE: 120 MMHG | WEIGHT: 114.81 LBS | OXYGEN SATURATION: 98 % | DIASTOLIC BLOOD PRESSURE: 84 MMHG | HEART RATE: 97 BPM | TEMPERATURE: 98 F | BODY MASS INDEX: 19.6 KG/M2 | HEIGHT: 64 IN | RESPIRATION RATE: 18 BRPM

## 2024-03-20 DIAGNOSIS — F32.A ANXIETY AND DEPRESSION: Primary | ICD-10-CM

## 2024-03-20 DIAGNOSIS — F41.9 ANXIETY AND DEPRESSION: Primary | ICD-10-CM

## 2024-03-20 PROBLEM — E53.8 VITAMIN B12 DEFICIENCY: Status: ACTIVE | Noted: 2024-03-20

## 2024-03-20 PROCEDURE — 99213 OFFICE O/P EST LOW 20 MIN: CPT | Mod: ,,, | Performed by: STUDENT IN AN ORGANIZED HEALTH CARE EDUCATION/TRAINING PROGRAM

## 2024-03-20 RX ORDER — CHOLECALCIFEROL (VITAMIN D3) 25 MCG
1000 TABLET ORAL DAILY
COMMUNITY

## 2024-03-20 RX ORDER — WITCH HAZEL 50 %
2000 PADS, MEDICATED (EA) TOPICAL DAILY
COMMUNITY

## 2024-03-20 RX ORDER — ESCITALOPRAM OXALATE 10 MG/1
10 TABLET ORAL DAILY
Qty: 30 TABLET | Refills: 1 | Status: SHIPPED | OUTPATIENT
Start: 2024-03-20 | End: 2024-05-20 | Stop reason: SDUPTHER

## 2024-03-20 NOTE — PROGRESS NOTES
Progress Note     ANDREI HOLLOWAY MD   [address]  [place] [address]     PATIENT NAME: Trav Santos  : 1990  DATE: 3/20/24  MRN: [id number]      Billing Provider: ANDREI HOLLOWAY MD  Level of Service:   Patient PCP Information       Provider PCP Type    ANDREI HOLLOWAY MD General                Depression (1 month follow up on depression and anxiety, Pt was started on Lexapro at his last visit, reports improvement in depression/anxiety. States that he does experience some sleepiness after taking the lexapro but that is improving since he first started the medication.) and Fatigue (1 month follow up on fatigue, Pt states he has started taking OTC vit B12 and vit D3 as directed and has noted an improvement in his fatigue. )      SUBJECTIVE:     Trav Santos is a 33 y.o.male who presents to clinic for Depression (1 month follow up on depression and anxiety, Pt was started on Lexapro at his last visit, reports improvement in depression/anxiety. States that he does experience some sleepiness after taking the lexapro but that is improving since he first started the medication.) and Fatigue (1 month follow up on fatigue, Pt states he has started taking OTC vit B12 and vit D3 as directed and has noted an improvement in his fatigue. )    Patient presents to clinic today for follow up for depression.  Patient was started on Lexapro.  Patient notes significant improvement in anxiety/depression.  Patient states that he did have some associated sleepiness but that has improved the longer he has been taking the medication.  Patient does take the medication at night and states that he does well throughout the day with the medicine.  He was noticed improvement both his anxiety and his depression.    He would also complained of fatigue since his COVID infection.  Patient was found to have vitamin B12 deficiency and was started on vitamin B12 supplementation.  Patient states that since  "starting the vitamin B12 deficiency in addition to his Lexapro he has noticed significant improvement in his fatigue.    All other pertinent review of systems negative. Please see HPI for details.     Past Medical History:  has a past medical history of Anxiety, Depression, and SVT (supraventricular tachycardia).   Past Surgical History:  has no past surgical history on file.  Family History: family history includes Bladder Cancer in his mother; Cervical cancer in his mother; Heart attack in his father; Ovarian cancer in his mother.  Social History:  reports that he has been smoking cigarettes. He started smoking about 17 years ago. He has a 17.2 pack-year smoking history. He has been exposed to tobacco smoke. He has never used smokeless tobacco. He reports that he does not currently use alcohol. He reports that he does not use drugs.  Allergies:   Review of patient's allergies indicates:   Allergen Reactions    Morphine Rash         Current Outpatient Medications:     cyanocobalamin 2000 MCG tablet, Take 2,000 mcg by mouth once daily., Disp: , Rfl:     vitamin D (VITAMIN D3) 1000 units Tab, Take 1,000 Units by mouth once daily., Disp: , Rfl:     EScitalopram oxalate (LEXAPRO) 10 MG tablet, Take 1 tablet (10 mg total) by mouth once daily., Disp: 30 tablet, Rfl: 1   OBJECTIVE:     Vital Signs   /84 (BP Location: Left arm, Patient Position: Sitting, BP Method: Medium (Automatic))   Pulse 97   Temp 97.7 °F (36.5 °C) (Oral)   Resp 18   Ht 5' 4" (1.626 m)   Wt 52.1 kg (114 lb 12.8 oz)   SpO2 98%   BMI 19.71 kg/m²     Physical Exam  Constitutional:       General: He is not in acute distress.     Appearance: Normal appearance. He is not ill-appearing.   HENT:      Head: Normocephalic and atraumatic.   Eyes:      Extraocular Movements: Extraocular movements intact.      Pupils: Pupils are equal, round, and reactive to light.   Cardiovascular:      Rate and Rhythm: Normal rate and regular rhythm.      Heart " sounds: No murmur heard.     No friction rub. No gallop.   Pulmonary:      Effort: Pulmonary effort is normal. No respiratory distress.      Breath sounds: Normal breath sounds. No wheezing, rhonchi or rales.   Abdominal:      Palpations: Abdomen is soft.      Tenderness: There is no abdominal tenderness. There is no guarding or rebound.   Musculoskeletal:         General: Normal range of motion.   Skin:     General: Skin is warm and dry.      Capillary Refill: Capillary refill takes less than 2 seconds.   Neurological:      General: No focal deficit present.      Mental Status: He is alert.   Psychiatric:         Mood and Affect: Mood normal.         Behavior: Behavior normal.         ASSESSMENT/PLAN:     1. Anxiety and depression  -     EScitalopram oxalate (LEXAPRO) 10 MG tablet; Take 1 tablet (10 mg total) by mouth once daily.  Dispense: 30 tablet; Refill: 1  Patient's symptoms improved with Lexapro 10 mg.  Patient is tolerating the medication without difficulty.  We will continue.  Refill provided.  Patient to follow up in 2 months for recheck.  If symptoms continue to improve can continue Lexapro 10 mg.  However, can adjust dose at follow up appointment if needed.      Follow up in about 2 months (around 5/20/2024) for Follow up anxiety/depression.      ANDREI HOLLOWAY MD  03/20/2024    Due to voice recognition software, sound alike and misspelled words may be contained in the documentation.

## 2024-05-20 ENCOUNTER — OFFICE VISIT (OUTPATIENT)
Dept: FAMILY MEDICINE | Facility: CLINIC | Age: 34
End: 2024-05-20

## 2024-05-20 VITALS
OXYGEN SATURATION: 97 % | SYSTOLIC BLOOD PRESSURE: 122 MMHG | BODY MASS INDEX: 20.08 KG/M2 | HEIGHT: 64 IN | TEMPERATURE: 98 F | DIASTOLIC BLOOD PRESSURE: 81 MMHG | HEART RATE: 100 BPM | RESPIRATION RATE: 18 BRPM | WEIGHT: 117.63 LBS

## 2024-05-20 DIAGNOSIS — R74.8 ELEVATED ALKALINE PHOSPHATASE LEVEL: Primary | ICD-10-CM

## 2024-05-20 DIAGNOSIS — F32.A ANXIETY AND DEPRESSION: ICD-10-CM

## 2024-05-20 DIAGNOSIS — E53.8 VITAMIN B12 DEFICIENCY: ICD-10-CM

## 2024-05-20 DIAGNOSIS — F41.9 ANXIETY AND DEPRESSION: ICD-10-CM

## 2024-05-20 LAB
ALBUMIN SERPL BCP-MCNC: 3.8 G/DL (ref 3.5–5)
ALBUMIN/GLOB SERPL: 1.1 {RATIO}
ALP SERPL-CCNC: 139 U/L (ref 45–115)
ALT SERPL W P-5'-P-CCNC: 18 U/L (ref 16–61)
ANION GAP SERPL CALCULATED.3IONS-SCNC: 9 MMOL/L (ref 7–16)
AST SERPL W P-5'-P-CCNC: 14 U/L (ref 15–37)
BILIRUB SERPL-MCNC: 0.2 MG/DL (ref ?–1.2)
BUN SERPL-MCNC: 10 MG/DL (ref 7–18)
BUN/CREAT SERPL: 9 (ref 6–20)
CALCIUM SERPL-MCNC: 9.1 MG/DL (ref 8.5–10.1)
CHLORIDE SERPL-SCNC: 108 MMOL/L (ref 98–107)
CO2 SERPL-SCNC: 25 MMOL/L (ref 21–32)
CREAT SERPL-MCNC: 1.12 MG/DL (ref 0.7–1.3)
EGFR (NO RACE VARIABLE) (RUSH/TITUS): 88 ML/MIN/1.73M2
GLOBULIN SER-MCNC: 3.5 G/DL (ref 2–4)
GLUCOSE SERPL-MCNC: 107 MG/DL (ref 74–106)
POTASSIUM SERPL-SCNC: 3.4 MMOL/L (ref 3.5–5.1)
PROT SERPL-MCNC: 7.3 G/DL (ref 6.4–8.2)
SODIUM SERPL-SCNC: 139 MMOL/L (ref 136–145)

## 2024-05-20 PROCEDURE — 99214 OFFICE O/P EST MOD 30 MIN: CPT | Mod: ,,, | Performed by: STUDENT IN AN ORGANIZED HEALTH CARE EDUCATION/TRAINING PROGRAM

## 2024-05-20 PROCEDURE — 80053 COMPREHEN METABOLIC PANEL: CPT | Mod: ,,, | Performed by: CLINICAL MEDICAL LABORATORY

## 2024-05-20 RX ORDER — ESCITALOPRAM OXALATE 10 MG/1
10 TABLET ORAL DAILY
Qty: 90 TABLET | Refills: 1 | Status: SHIPPED | OUTPATIENT
Start: 2024-05-20 | End: 2025-05-20

## 2024-05-20 NOTE — PROGRESS NOTES
Progress Note     ANDREI HOLLOWAY MD   [address]  [place] [address]     PATIENT NAME: Trav Santos  : 1990  DATE: 24  MRN: [id number]      Billing Provider: ANDREI HOLLOAWY MD  Level of Service: UT OFFICE/OUTPT VISIT, EST, LEVL IV, 30-39 MIN  Patient PCP Information       Provider PCP Type    ANDREI HOLLOWAY MD General                Follow-up (Patient is here today for 2 month follow up. )      SUBJECTIVE:     Trav Santos is a 34 y.o.male who presents to clinic for Follow-up (Patient is here today for 2 month follow up. )    Patient presents to clinic today for follow up for depression.  Patient has a history of anxiety and depression it was currently taking Lexapro.  He notes his symptoms are much improved from previous.  He would like to continue Lexapro 10 mg.  Notes his energy level has also improved significantly.  He was taking supplemental vitamin B12.  He ask if it he would benefit from taking vitamin B12 shots.  Discussed risks of injections.  Counseled that we could recheck his B12 level in 4 months.  If B12 level is appropriate at that time, we can continue with oral supplementation.  However it continues to be low we can transition to injections.  Patient is in agreement with plan.  Of note, patient did have elevated alkaline phosphatase in his last set of blood work.  He was amenable to having that rechecked today.    All other pertinent review of systems negative. Please see HPI for details.     Past Medical History:  has a past medical history of Anxiety, Depression, and SVT (supraventricular tachycardia).   Past Surgical History:  has no past surgical history on file.  Family History: family history includes Bladder Cancer in his mother; Cervical cancer in his mother; Heart attack in his father; Ovarian cancer in his mother.  Social History:  reports that he has been smoking cigarettes. He started smoking about 17 years ago. He has a 17.4  "pack-year smoking history. He has been exposed to tobacco smoke. He has never used smokeless tobacco. He reports that he does not currently use alcohol. He reports that he does not use drugs.  Allergies:   Review of patient's allergies indicates:   Allergen Reactions    Morphine Rash         Current Outpatient Medications:     cyanocobalamin 2000 MCG tablet, Take 2,000 mcg by mouth once daily., Disp: , Rfl:     vitamin D (VITAMIN D3) 1000 units Tab, Take 1,000 Units by mouth once daily., Disp: , Rfl:     EScitalopram oxalate (LEXAPRO) 10 MG tablet, Take 1 tablet (10 mg total) by mouth once daily., Disp: 90 tablet, Rfl: 1   OBJECTIVE:     Vital Signs   /81 (BP Location: Left arm, Patient Position: Sitting, BP Method: Medium (Automatic))   Pulse 100   Temp 98.3 °F (36.8 °C) (Oral)   Resp 18   Ht 5' 4" (1.626 m)   Wt 53.3 kg (117 lb 9.6 oz)   SpO2 97%   BMI 20.19 kg/m²     Physical Exam  Constitutional:       General: He is not in acute distress.     Appearance: Normal appearance. He is not ill-appearing.   HENT:      Head: Normocephalic and atraumatic.   Eyes:      Extraocular Movements: Extraocular movements intact.      Pupils: Pupils are equal, round, and reactive to light.   Cardiovascular:      Rate and Rhythm: Normal rate and regular rhythm.      Heart sounds: No murmur heard.     No friction rub. No gallop.   Pulmonary:      Effort: Pulmonary effort is normal. No respiratory distress.      Breath sounds: Normal breath sounds. No wheezing, rhonchi or rales.   Abdominal:      Palpations: Abdomen is soft.      Tenderness: There is no abdominal tenderness. There is no guarding or rebound.   Musculoskeletal:         General: Normal range of motion.   Skin:     General: Skin is warm and dry.      Capillary Refill: Capillary refill takes less than 2 seconds.   Neurological:      General: No focal deficit present.      Mental Status: He is alert.   Psychiatric:         Mood and Affect: Mood normal.         " Behavior: Behavior normal.         ASSESSMENT/PLAN:     1. Elevated alkaline phosphatase level  -     Comprehensive Metabolic Panel; Future; Expected date: 05/20/2024  Patient with elevated alkaline phosphatase earlier this year.  Obtaining repeat to ensure resolution.  If continues to be elevated, we will obtain GGT.    2. Anxiety and depression  -     EScitalopram oxalate (LEXAPRO) 10 MG tablet; Take 1 tablet (10 mg total) by mouth once daily.  Dispense: 90 tablet; Refill: 1  Patient with anxiety and depression.  Patient currently doing well with Lexapro.  Patient to continue.  Refill provided.    3. Vitamin B12 deficiency  Patient with vitamin B12 deficiency.  We will obtain recheck in 4 months.  If appropriate, we will continue vitamin B12 supplementation orally.  If it continues to be low, we will transition to injections.      Follow up in about 4 months (around 9/20/2024) for Recheck Anxiety and B12.      ANDREI HOLLOWAY MD  05/20/2024    Due to voice recognition software, sound alike and misspelled words may be contained in the documentation.

## 2024-05-21 DIAGNOSIS — R74.8 ELEVATED ALKALINE PHOSPHATASE LEVEL: Primary | ICD-10-CM

## 2024-05-21 LAB
25(OH)D3 SERPL-MCNC: 56.3 NG/ML
GGT SERPL-CCNC: 18 U/L (ref 15–85)

## 2024-05-21 PROCEDURE — 82977 ASSAY OF GGT: CPT | Mod: ,,, | Performed by: CLINICAL MEDICAL LABORATORY

## 2024-05-21 PROCEDURE — 82306 VITAMIN D 25 HYDROXY: CPT | Mod: ,,, | Performed by: CLINICAL MEDICAL LABORATORY

## 2024-05-22 NOTE — PROGRESS NOTES
Please let patient know his Vit D level was normal. We will continue to monitor his alkaline phosphatase.

## 2024-11-14 DIAGNOSIS — F41.9 ANXIETY AND DEPRESSION: ICD-10-CM

## 2024-11-14 DIAGNOSIS — F32.A ANXIETY AND DEPRESSION: ICD-10-CM

## 2024-11-14 RX ORDER — ESCITALOPRAM OXALATE 10 MG/1
10 TABLET ORAL DAILY
Qty: 30 TABLET | Refills: 0 | Status: SHIPPED | OUTPATIENT
Start: 2024-11-14 | End: 2025-11-14

## 2024-11-14 NOTE — TELEPHONE ENCOUNTER
Pt overdue for follow up. Attached one month supply to get pt booked for an appointment. Please advise.

## 2024-11-14 NOTE — TELEPHONE ENCOUNTER
----- Message from Pamela sent at 11/14/2024  1:44 PM CST -----   PT called in needing refill on prescription of EScitalopram oxalate (LEXAPRO) 10 MG tablet.    Pt call [facility] # [phone]

## 2024-12-23 ENCOUNTER — OFFICE VISIT (OUTPATIENT)
Dept: FAMILY MEDICINE | Facility: CLINIC | Age: 34
End: 2024-12-23

## 2024-12-23 VITALS
SYSTOLIC BLOOD PRESSURE: 124 MMHG | BODY MASS INDEX: 20.04 KG/M2 | DIASTOLIC BLOOD PRESSURE: 82 MMHG | RESPIRATION RATE: 18 BRPM | WEIGHT: 117.38 LBS | HEART RATE: 80 BPM | OXYGEN SATURATION: 98 % | TEMPERATURE: 98 F | HEIGHT: 64 IN

## 2024-12-23 DIAGNOSIS — E53.8 VITAMIN B12 DEFICIENCY: Primary | ICD-10-CM

## 2024-12-23 DIAGNOSIS — F32.A ANXIETY AND DEPRESSION: ICD-10-CM

## 2024-12-23 DIAGNOSIS — F41.9 ANXIETY AND DEPRESSION: ICD-10-CM

## 2024-12-23 DIAGNOSIS — Z91.09 ENVIRONMENTAL ALLERGIES: ICD-10-CM

## 2024-12-23 LAB — VIT B12 SERPL-MCNC: >2000 PG/ML (ref 213–816)

## 2024-12-23 PROCEDURE — 82607 VITAMIN B-12: CPT | Mod: ,,, | Performed by: CLINICAL MEDICAL LABORATORY

## 2024-12-23 PROCEDURE — 99213 OFFICE O/P EST LOW 20 MIN: CPT | Mod: ,,, | Performed by: NURSE PRACTITIONER

## 2024-12-23 RX ORDER — ESCITALOPRAM OXALATE 10 MG/1
10 TABLET ORAL DAILY
Qty: 90 TABLET | Refills: 1 | Status: SHIPPED | OUTPATIENT
Start: 2024-12-23 | End: 2025-12-23

## 2024-12-23 RX ORDER — FLUTICASONE PROPIONATE 50 MCG
1 SPRAY, SUSPENSION (ML) NASAL DAILY
Qty: 11.1 ML | Refills: 2 | Status: SHIPPED | OUTPATIENT
Start: 2024-12-23

## 2024-12-23 NOTE — PROGRESS NOTES
NEHEMIAH Casey   [facility] [address] [place] [address]  [phone]      PATIENT NAME: Trav Santos  : 1990  DATE: 24  MRN: [id number]      Billing Provider: NEHEMIAH Casey  Level of Service:   Patient PCP Information       Provider PCP Type    ANDREI GIPSON MD General                Medications and Allergies     Medications  Outpatient Medications Marked as Taking for the 24 encounter (Office Visit) with Marilyn Belle FNP   Medication Sig Dispense Refill    cyanocobalamin 2000 MCG tablet Take 2,000 mcg by mouth once daily.      vitamin D (VITAMIN D3) 1000 units Tab Take 1,000 Units by mouth once daily.      [DISCONTINUED] EScitalopram oxalate (LEXAPRO) 10 MG tablet Take 1 tablet (10 mg total) by mouth once daily. 30 tablet 0       Allergies  Review of patient's allergies indicates:   Allergen Reactions    Morphine Rash       History of Present Illness    CHIEF COMPLAINT:  Patient presents today for medication refill and nasal congestion.    ALLERGIC RHINITIS:  He reports significant nasal congestion due to allergies. He has tried OTC nasal sprays but experiences rebound congestion with their use.    ANXIETY:  He reports good response to Lexapro for anxiety management. His previous B12 deficiency was contributing to anxiety symptoms.    MEDICATIONS:  He takes vitamin D and K supplements. He previously took magnesium supplement which helped with sleep.    SOCIAL HISTORY:  He is a current everyday smoker.      ROS:  General: -fever, -chills, -fatigue, -weight gain, -weight loss  Eyes: -vision changes, -redness, -discharge  ENT: -ear pain, +nasal congestion, -sore throat  Cardiovascular: -chest pain, -palpitations, -lower extremity edema  Respiratory: -cough, -shortness of breath  Gastrointestinal: -abdominal pain, -nausea, -vomiting, -diarrhea, -constipation, -blood in stool  Genitourinary: -dysuria,  -hematuria, -frequency  Musculoskeletal: -joint pain, -muscle pain  Skin: -rash, -lesion  Neurological: -headache, -dizziness, -numbness, -tingling  Psychiatric: +anxiety, -depression, -sleep difficulty  Allergic: +seasonal allergies          Physical Exam    General: No acute distress. Well-developed. Well-nourished.  Eyes: EOMI. Sclerae anicteric.  HENT: Normocephalic. Atraumatic. Nares patent. Moist oral mucosa.  Cardiovascular: Regular rate. Regular rhythm.   Respiratory: Normal respiratory effort. Clear to auscultation bilaterally. No rales. No rhonchi. No wheezing.  Abdomen: Soft. Non-tender. Non-distended. Normoactive bowel sounds.  Musculoskeletal: No  obvious deformity.  Extremities: No lower extremity edema.  Neurological: Alert & oriented x3. No slurred speech. Normal gait.  Psychiatric: Normal mood. Normal affect. Good insight. Good judgment.  Skin: Warm. Dry. No rash.          Assessment & Plan    IMPRESSION:  - Assessed patient for medication refills, noting last check was in May  - Evaluated nasal congestion and allergy symptoms  - Considered B12 deficiency as a potential contributor to anxiety  - Reviewed effectiveness of current anxiety medication    B12 DEFICIENCY:  - Explained that B12 deficiency can contribute to anxiety symptoms.  - Continued B12 supplementation.  - Ordered B12 level check.  - Scheduled follow-up to review B12 levels.  - Noted the patient's history of B12 deficiency causing anxiety.  - Acknowledged the need to check B12 levels before refilling medication.    ANXIETY:  - Refilled Lexapro for anxiety management.    ALLERGIES AND NASAL CONGESTION:  - Discussed potential for rebound congestion with OTC nasal sprays.  - Prescribed Flonase nasal spray for allergy symptoms and nasal congestion.  - Noted that the patient reports significant nasal congestion and allergy problems.  - Discussed nasal spray options with the patient.    SMOKING:  - Identified the patient as a current  everyday smoker.    MEDICATIONS/SUPPLEMENTS:  - Noted that the patient takes OTC vitamin D and K supplements, and previously took magnesium as a cofactor.  - Patient to continue taking OTC vitamin D and K supplements.          Health Maintenance Due   Topic Date Due    TETANUS VACCINE  Never done    Pneumococcal Vaccines (Age 0-49) (1 of 2 - PCV) Never done    COVID-19 Vaccine (1 - 2024-25 season) Never done       Problem List Items Addressed This Visit          Psychiatric    Anxiety and depression    Relevant Medications    EScitalopram oxalate (LEXAPRO) 10 MG tablet       Endocrine    Vitamin B12 deficiency - Primary    Relevant Orders    Vitamin B12     Other Visit Diagnoses       Environmental allergies        Relevant Medications    fluticasone propionate (FLONASE) 50 mcg/actuation nasal spray            Health Maintenance Topics with due status: Not Due       Topic Last Completion Date    Lipid Panel 02/22/2024    RSV Vaccine (Age 60+ and Pregnant patients) Not Due       Future Appointments   Date Time Provider Department Center   6/23/2025  1:00 PM [facility]        This note was generated with the assistance of ambient listening technology. Verbal consent was obtained by the patient and accompanying visitor(s) for the recording of patient appointment to facilitate this note. I attest to having reviewed and edited the generated note for accuracy, though some syntax or spelling errors may persist. Please contact the author of this note for any clarification.     Signature:  NEHEMIAH Casey  [facility] [address] [place] [address]  [phone]    Date of encounter: 12/23/24